# Patient Record
Sex: FEMALE | Race: WHITE | NOT HISPANIC OR LATINO | Employment: OTHER | URBAN - METROPOLITAN AREA
[De-identification: names, ages, dates, MRNs, and addresses within clinical notes are randomized per-mention and may not be internally consistent; named-entity substitution may affect disease eponyms.]

---

## 2023-10-20 ENCOUNTER — LAB (OUTPATIENT)
Dept: LAB | Facility: LAB | Age: 68
End: 2023-10-20
Payer: MEDICARE

## 2023-10-20 DIAGNOSIS — E03.9 HYPOTHYROIDISM, UNSPECIFIED: ICD-10-CM

## 2023-10-20 DIAGNOSIS — M81.0 AGE-RELATED OSTEOPOROSIS WITHOUT CURRENT PATHOLOGICAL FRACTURE: Primary | ICD-10-CM

## 2023-10-20 LAB
ALBUMIN SERPL BCP-MCNC: 4.7 G/DL (ref 3.4–5)
ALP SERPL-CCNC: 59 U/L (ref 33–136)
ALT SERPL W P-5'-P-CCNC: 15 U/L (ref 7–45)
ANION GAP SERPL CALC-SCNC: 14 MMOL/L (ref 10–20)
AST SERPL W P-5'-P-CCNC: 23 U/L (ref 9–39)
BILIRUB SERPL-MCNC: 1 MG/DL (ref 0–1.2)
BUN SERPL-MCNC: 9 MG/DL (ref 6–23)
CALCIUM SERPL-MCNC: 9.9 MG/DL (ref 8.6–10.6)
CHLORIDE SERPL-SCNC: 101 MMOL/L (ref 98–107)
CHOLEST SERPL-MCNC: 316 MG/DL (ref 0–199)
CHOLESTEROL/HDL RATIO: 3.9
CO2 SERPL-SCNC: 28 MMOL/L (ref 21–32)
CREAT SERPL-MCNC: 0.8 MG/DL (ref 0.5–1.05)
ERYTHROCYTE [DISTWIDTH] IN BLOOD BY AUTOMATED COUNT: 13.5 % (ref 11.5–14.5)
GFR SERPL CREATININE-BSD FRML MDRD: 81 ML/MIN/1.73M*2
GLUCOSE SERPL-MCNC: 85 MG/DL (ref 74–99)
HCT VFR BLD AUTO: 46.3 % (ref 36–46)
HDLC SERPL-MCNC: 80.2 MG/DL
HGB BLD-MCNC: 14.8 G/DL (ref 12–16)
LDLC SERPL CALC-MCNC: 223 MG/DL
MCH RBC QN AUTO: 29.2 PG (ref 26–34)
MCHC RBC AUTO-ENTMCNC: 32 G/DL (ref 32–36)
MCV RBC AUTO: 91 FL (ref 80–100)
NON HDL CHOLESTEROL: 236 MG/DL (ref 0–149)
NRBC BLD-RTO: 0 /100 WBCS (ref 0–0)
PLATELET # BLD AUTO: 264 X10*3/UL (ref 150–450)
PMV BLD AUTO: 11.5 FL (ref 7.5–11.5)
POTASSIUM SERPL-SCNC: 4.4 MMOL/L (ref 3.5–5.3)
PROT SERPL-MCNC: 7.1 G/DL (ref 6.4–8.2)
RBC # BLD AUTO: 5.07 X10*6/UL (ref 4–5.2)
SODIUM SERPL-SCNC: 139 MMOL/L (ref 136–145)
TRIGL SERPL-MCNC: 64 MG/DL (ref 0–149)
TSH SERPL-ACNC: 5.11 MIU/L (ref 0.44–3.98)
VLDL: 13 MG/DL (ref 0–40)
WBC # BLD AUTO: 4.6 X10*3/UL (ref 4.4–11.3)

## 2023-10-20 PROCEDURE — 36415 COLL VENOUS BLD VENIPUNCTURE: CPT

## 2023-10-20 PROCEDURE — 80053 COMPREHEN METABOLIC PANEL: CPT

## 2023-10-20 PROCEDURE — 85027 COMPLETE CBC AUTOMATED: CPT

## 2023-10-20 PROCEDURE — 84443 ASSAY THYROID STIM HORMONE: CPT

## 2023-10-20 PROCEDURE — 80061 LIPID PANEL: CPT

## 2023-10-21 PROBLEM — T14.8XXA DEEP TISSUE INJURY: Status: ACTIVE | Noted: 2023-10-21

## 2023-10-21 PROBLEM — K59.04 CHRONIC IDIOPATHIC CONSTIPATION: Status: ACTIVE | Noted: 2023-10-21

## 2023-10-21 PROBLEM — R10.13 DYSPEPSIA: Status: ACTIVE | Noted: 2023-10-21

## 2023-10-21 PROBLEM — D18.03 HEPATIC HEMANGIOMA: Status: ACTIVE | Noted: 2023-10-21

## 2023-10-21 PROBLEM — E03.9 HYPOTHYROIDISM: Status: ACTIVE | Noted: 2023-10-21

## 2023-10-21 PROBLEM — R14.0 ABDOMINAL BLOATING: Status: ACTIVE | Noted: 2023-10-21

## 2023-10-21 PROBLEM — K82.4 GALLBLADDER POLYP: Status: ACTIVE | Noted: 2023-10-21

## 2023-10-21 PROBLEM — R05.3 PERSISTENT COUGH: Status: ACTIVE | Noted: 2023-10-21

## 2023-10-21 PROBLEM — T63.441A BEE STING: Status: ACTIVE | Noted: 2023-10-21

## 2023-10-21 PROBLEM — M81.0 OSTEOPOROSIS: Status: ACTIVE | Noted: 2023-10-21

## 2023-10-21 PROBLEM — F41.8 DEPRESSION WITH ANXIETY: Status: ACTIVE | Noted: 2023-10-21

## 2023-10-21 PROBLEM — K58.9 IBS (IRRITABLE BOWEL SYNDROME): Status: ACTIVE | Noted: 2023-10-21

## 2023-10-21 PROBLEM — L89.90 PRESSURE SORE: Status: ACTIVE | Noted: 2023-10-21

## 2023-10-21 PROBLEM — M79.89 SWELLING OF TOE OF RIGHT FOOT: Status: ACTIVE | Noted: 2023-10-21

## 2023-10-21 RX ORDER — LEVOTHYROXINE SODIUM 50 UG/1
50 TABLET ORAL DAILY
COMMUNITY
End: 2023-12-27 | Stop reason: SDUPTHER

## 2023-10-21 RX ORDER — PLECANATIDE 3 MG/1
1 TABLET ORAL DAILY
COMMUNITY
Start: 2021-12-17

## 2023-10-24 ENCOUNTER — OFFICE VISIT (OUTPATIENT)
Dept: PRIMARY CARE | Facility: CLINIC | Age: 68
End: 2023-10-24
Payer: MEDICARE

## 2023-10-24 VITALS
WEIGHT: 162.48 LBS | HEIGHT: 61 IN | DIASTOLIC BLOOD PRESSURE: 78 MMHG | BODY MASS INDEX: 30.68 KG/M2 | OXYGEN SATURATION: 99 % | RESPIRATION RATE: 16 BRPM | SYSTOLIC BLOOD PRESSURE: 124 MMHG | HEART RATE: 72 BPM

## 2023-10-24 DIAGNOSIS — E03.9 ACQUIRED HYPOTHYROIDISM: ICD-10-CM

## 2023-10-24 DIAGNOSIS — M81.0 OSTEOPOROSIS WITHOUT CURRENT PATHOLOGICAL FRACTURE, UNSPECIFIED OSTEOPOROSIS TYPE: ICD-10-CM

## 2023-10-24 DIAGNOSIS — Z12.11 COLON CANCER SCREENING: Primary | ICD-10-CM

## 2023-10-24 DIAGNOSIS — E78.5 DYSLIPIDEMIA: ICD-10-CM

## 2023-10-24 DIAGNOSIS — Z12.31 ENCOUNTER FOR SCREENING MAMMOGRAM FOR MALIGNANT NEOPLASM OF BREAST: ICD-10-CM

## 2023-10-24 PROCEDURE — 1036F TOBACCO NON-USER: CPT | Performed by: INTERNAL MEDICINE

## 2023-10-24 PROCEDURE — 1125F AMNT PAIN NOTED PAIN PRSNT: CPT | Performed by: INTERNAL MEDICINE

## 2023-10-24 PROCEDURE — G0439 PPPS, SUBSEQ VISIT: HCPCS | Performed by: INTERNAL MEDICINE

## 2023-10-24 PROCEDURE — 1159F MED LIST DOCD IN RCRD: CPT | Performed by: INTERNAL MEDICINE

## 2023-10-24 PROCEDURE — 99213 OFFICE O/P EST LOW 20 MIN: CPT | Performed by: INTERNAL MEDICINE

## 2023-10-24 PROCEDURE — 99397 PER PM REEVAL EST PAT 65+ YR: CPT | Performed by: INTERNAL MEDICINE

## 2023-10-24 ASSESSMENT — ENCOUNTER SYMPTOMS
DEPRESSION: 0
LOSS OF SENSATION IN FEET: 0
OCCASIONAL FEELINGS OF UNSTEADINESS: 0

## 2023-10-24 NOTE — PROGRESS NOTES
Subjective   Reason for Visit: Jessy James is an 68 y.o. female here for a Medicare Wellness visit.     Past Medical, Surgical, and Family History reviewed and updated in chart.    Reviewed all medications by prescribing practitioner or clinical pharmacist (such as prescriptions, OTCs, herbal therapies and supplements) and documented in the medical record.    No recent hospitalizations.    All medications reviewed and reconciled by me the provider..  No use of controlled substances or opiates.    Family history, social history reviewed, no changes.    Patient does not smoke.    Patient does not drink.    Patient hydrates adequately daily.  Eats a well-balanced healthy diet.     Exercises adequately including walking and doing weightbearing exercises.    Patient denies any difficulty with memory or cognition.     Denies any difficulty with hearing.  Patient does not wear hearing aids.    No fall risk.  No recent falls.  Denies any difficulty walking.    Patient with no history of depression anxiety, denies any loss of interest, no feeling of sadness, no lack of motivation.    Patient is independent in all ADLs and IADLs.  Independent bathing, dressing, walking.  Takes care of own finances, shopping and cooking.     End-of-life decision-making power of  reviewed with patient.      HPI  Very pleasant 68-year-old female here for annual wellness and physical.  Doing well.  She is exercising.  Eating a healthy diet.  Her recent lipid panel was quite out of range and unlike her past levels, she was eating red meats few days past.  She has since significant with her lifestyle.  In terms of her anxiety, symptoms have resolved.      Patient Care Team:  Hermes Mason MD as PCP - General  Hermes Mason MD as PCP - [employer] PCP     Review of Systems  No fevers no chills, no unintentional weight changes.  No night sweats.    No URI symptoms.    No new or unusual headaches.    No cough no  "wheezing.    No chest pain or shortness of breath.  No orthopnea no PND.  No palpitations.    Appetite intact, no nausea vomiting diarrhea, no reflux-like symptoms.  No abdominal pain.  No dark stools.    No new joint pain.  No fatigue.  No weakness.    No heat or cold intolerance, constipation diarrhea, unintentional weight changes.    No change in memory  Objective   Vitals:  /78   Pulse 72   Resp 16   Ht 1.549 m (5' 1\")   Wt 73.7 kg (162 lb 7.7 oz)   SpO2 99%   BMI 30.70 kg/m²       Physical Exam  Calm coherent and appropriate    Neck is supple and none tender    Breathing comfortably, clear to auscultation bilaterally    Regular rate rhythm, no murmur gallops or rubs.  Good distal pulses.  No edema.  No JVD.    Abdomen soft and nontender, normal bowel sounds.    Extremities warm well perfused with no clubbing or cyanosis    Cognition intact.    Assessment/Plan   68-year-old female here for annual wellness and physical doing very well.    Dyslipidemia: This was very unlike her past levels, she has already improved her diet.  We will check levels again in 4 to 5 weeks.    Hypothyroidism: TSH slightly above goal, asymptomatic, will check with next of labs.      History of osteoporosis: She is overdue for bone density, she is not on treatment, will get DEXA scan and vitamin D levels.  Continue with weightbearing exercises.    Health maintenance: She tells me she has Cologuard pending, she gets special mammograms due to implants, was done 2 months ago.  Already had flu shot.  She is going to get COVID at local pharmacy.  Discussed end-of-life decision-making power of , look into this.  Overall has a healthy lifestyle.         "

## 2023-10-24 NOTE — PROGRESS NOTES
Subjective   Reason for Visit: Jessy James is an 68 y.o. female here for a Medicare Wellness visit.               HPI    Patient Care Team:  Hermes Mason MD as PCP - General  Hermes Mason MD as PCP - [employer] PCP     Review of Systems    Objective   Vitals:  There were no vitals taken for this visit.      Physical Exam    Assessment/Plan   Problem List Items Addressed This Visit    None

## 2023-10-31 ENCOUNTER — OFFICE VISIT (OUTPATIENT)
Dept: PRIMARY CARE | Facility: CLINIC | Age: 68
End: 2023-10-31
Payer: MEDICARE

## 2023-10-31 VITALS
BODY MASS INDEX: 30.68 KG/M2 | HEART RATE: 78 BPM | HEIGHT: 61 IN | RESPIRATION RATE: 16 BRPM | OXYGEN SATURATION: 95 % | WEIGHT: 162.48 LBS | SYSTOLIC BLOOD PRESSURE: 130 MMHG | DIASTOLIC BLOOD PRESSURE: 80 MMHG

## 2023-10-31 DIAGNOSIS — R10.11 RIGHT UPPER QUADRANT ABDOMINAL PAIN: Primary | ICD-10-CM

## 2023-10-31 DIAGNOSIS — E78.5 DYSLIPIDEMIA: ICD-10-CM

## 2023-10-31 DIAGNOSIS — Z12.11 COLON CANCER SCREENING: ICD-10-CM

## 2023-10-31 PROCEDURE — 1125F AMNT PAIN NOTED PAIN PRSNT: CPT | Performed by: INTERNAL MEDICINE

## 2023-10-31 PROCEDURE — 99214 OFFICE O/P EST MOD 30 MIN: CPT | Performed by: INTERNAL MEDICINE

## 2023-10-31 PROCEDURE — 1159F MED LIST DOCD IN RCRD: CPT | Performed by: INTERNAL MEDICINE

## 2023-10-31 PROCEDURE — 1036F TOBACCO NON-USER: CPT | Performed by: INTERNAL MEDICINE

## 2023-10-31 NOTE — PROGRESS NOTES
"Subjective   Patient ID: Jessy James is a 68 y.o. female who presents for Abdominal Pain (Upper right side ).       68-year-old female here for evaluation of abdominal pain.  She had recent URI with cough, has also been doing a lot of weightlifting exercises, started having pain, initially she describes it as abdominal pain but on further questioning and exam pain appears to be costochondral on the right side.  Gets worse with sneezing laughing or raising her arm.  Gets better if she sits still.   No chest pain shortness of breath.  No abdominal pain otherwise.  No nausea vomiting diarrhea.  Appetite intact.  No dysuria urgency hematuria.  No fevers no chills.  No night sweats.  No weight loss.    Objective   /80   Pulse 78   Resp 16   Ht 1.549 m (5' 1\")   Wt 73.7 kg (162 lb 7.7 oz)   SpO2 95%   BMI 30.70 kg/m²     Physical Exam  Calm coherent and appropriate    Neck is supple and none tender    Breathing comfortably, clear to auscultation bilaterally, she has about 10-11 right midclavicular and axillary lower intercostal space tenderness.  No deformity.    Regular rate rhythm, no murmur gallops or rubs.  Good distal pulses.  No edema.  No JVD.    Abdomen soft and nontender, normal bowel sounds.  No hepatosplenomegaly.  No right upper quadrant tenderness.  Mcguire sign negative.    Extremities warm well perfused with no clubbing or cyanosis    Cognition intact.    Assessment/Plan   68-year-old female here for for evaluation of what appears to be intercostal pain due to recent URI and weight lifting, reassured patient, to take a break from all core exercises and upper body exercises for 10 days or until the pain has resolved.  Call with any new symptoms or if she still has pain by end of next week.    Dyslipidemia: Significantly elevated LDL that is unusual for her, when compared to past levels, she is improving her diet and is going to repeat the labs in November.  Today again we went over some " dietary modifications she can make to help with her cholesterol.      Hypothyroidism: Check TSH in November.    History of osteoporosis: She is overdue for bone density, she is not on treatment, will get DEXA scan and vitamin D levels.  Continue with weightbearing exercises.     Health maintenance: Today she tells me that insurance actually is asking for a Cologuard order, she thought that she already has 1.  Will place order., she gets special mammograms due to implants, was done 2 months ago.  Already had flu shot.  She is going to get COVID at local pharmacy.        Greater than 25 minutes spent counseling patient, documentation.  All questions answered.

## 2023-11-14 ENCOUNTER — TELEPHONE (OUTPATIENT)
Dept: PRIMARY CARE | Facility: CLINIC | Age: 68
End: 2023-11-14
Payer: MEDICARE

## 2023-11-15 ENCOUNTER — OFFICE VISIT (OUTPATIENT)
Dept: PRIMARY CARE | Facility: CLINIC | Age: 68
End: 2023-11-15
Payer: MEDICARE

## 2023-11-15 VITALS
HEART RATE: 62 BPM | SYSTOLIC BLOOD PRESSURE: 130 MMHG | RESPIRATION RATE: 16 BRPM | WEIGHT: 162.48 LBS | BODY MASS INDEX: 30.68 KG/M2 | OXYGEN SATURATION: 96 % | HEIGHT: 61 IN | DIASTOLIC BLOOD PRESSURE: 78 MMHG

## 2023-11-15 DIAGNOSIS — F41.9 ANXIETY: Primary | ICD-10-CM

## 2023-11-15 DIAGNOSIS — K58.2 IRRITABLE BOWEL SYNDROME WITH BOTH CONSTIPATION AND DIARRHEA: ICD-10-CM

## 2023-11-15 PROCEDURE — 1036F TOBACCO NON-USER: CPT | Performed by: INTERNAL MEDICINE

## 2023-11-15 PROCEDURE — 1125F AMNT PAIN NOTED PAIN PRSNT: CPT | Performed by: INTERNAL MEDICINE

## 2023-11-15 PROCEDURE — 99214 OFFICE O/P EST MOD 30 MIN: CPT | Performed by: INTERNAL MEDICINE

## 2023-11-15 PROCEDURE — 1159F MED LIST DOCD IN RCRD: CPT | Performed by: INTERNAL MEDICINE

## 2023-11-15 RX ORDER — BUSPIRONE HYDROCHLORIDE 7.5 MG/1
7.5 TABLET ORAL 2 TIMES DAILY
Qty: 60 TABLET | Refills: 1 | Status: SHIPPED | OUTPATIENT
Start: 2023-11-15 | End: 2024-01-14

## 2023-11-15 NOTE — PROGRESS NOTES
"Sick Visit/Abdominal Pain     Jessy is a 68-year-old female with hypothyroidism dyslipidemia IBS anxiety here for evaluation of abdominal pain.  She has had bloating, intermittent constipation and diarrhea.  Very typical of her IBS.  No nausea no vomiting.  Appetite intact.  No weight loss.  No blood in the stool.  No urgency dysuria hematuria.  No fevers no chills.  No chest pain shortness of breath.  Her IBS was very well controlled when she was on medication for anxiety, but she gained weight on Lexapro and stopped taking it.  Anxiety is coming back, has daily symptoms.  Otherwise she is doing well.  She also had some intercostal pain last visit for exercising, that is better.    Blood pressure 130/78, pulse 62, resp. rate 16, height 1.549 m (5' 1\"), weight 73.7 kg (162 lb 7.7 oz), SpO2 96 %.  Calm coherent and appropriate    Neck is supple and none tender    Breathing comfortably, clear to auscultation bilaterally    Regular rate rhythm, no murmur gallops or rubs.  Good distal pulses.  No edema.  No JVD.    Abdomen is soft, she has some tenderness of the right axillary about eighth intercostal space similar to last visit, but no right upper quadrant abdominal pain.  No rebound or guarding.  No hepatosplenomegaly.    Extremities warm well perfused with no clubbing or cyanosis    Cognition intact.    Assessment plan:  Jessy is a pleasant 68-year-old female here for evaluation of abdominal pain, no red flag symptoms, benign exam, this appears to be more related to her IBS which is flaring.    IBS: Since her anxiety has been poorly controlled her IBS is flaring.  We discussed importance of going back on medication for anxiety, she is agreeable.  If symptoms do not resolve with better control of anxiety patient will call me in 2 to 3 weeks and then will get further work-up for GI etiology.  Call with any new symptoms.    Anxiety: She gained weight on Lexapro, will started on BuSpar 7.5 mg twice a day, call me " by next week if she is not better, we can try Zoloft and see how she does.    Hypothyroidism: She has labs pending for lipid TSH.    History of osteoporosis: She is due for DEXA scan, this has been ordered.  Continue with weightbearing exercises.    Health maintenance: Pending for Cologuard.  Mammogram up-to-date.

## 2023-12-27 DIAGNOSIS — E03.9 ACQUIRED HYPOTHYROIDISM: Primary | ICD-10-CM

## 2023-12-28 RX ORDER — LEVOTHYROXINE SODIUM 50 UG/1
50 TABLET ORAL DAILY
Qty: 90 TABLET | Refills: 0 | Status: SHIPPED | OUTPATIENT
Start: 2023-12-28 | End: 2024-03-05 | Stop reason: ALTCHOICE

## 2024-03-04 ENCOUNTER — LAB (OUTPATIENT)
Dept: LAB | Facility: LAB | Age: 69
End: 2024-03-04
Payer: MEDICARE

## 2024-03-04 DIAGNOSIS — M81.0 OSTEOPOROSIS WITHOUT CURRENT PATHOLOGICAL FRACTURE, UNSPECIFIED OSTEOPOROSIS TYPE: ICD-10-CM

## 2024-03-04 DIAGNOSIS — E78.5 DYSLIPIDEMIA: ICD-10-CM

## 2024-03-04 DIAGNOSIS — E03.9 ACQUIRED HYPOTHYROIDISM: ICD-10-CM

## 2024-03-04 LAB
25(OH)D3 SERPL-MCNC: 36 NG/ML (ref 30–100)
CHOLEST SERPL-MCNC: 222 MG/DL (ref 0–199)
CHOLESTEROL/HDL RATIO: 2.8
HDLC SERPL-MCNC: 79.8 MG/DL
LDLC SERPL CALC-MCNC: 130 MG/DL
NON HDL CHOLESTEROL: 142 MG/DL (ref 0–149)
TRIGL SERPL-MCNC: 62 MG/DL (ref 0–149)
TSH SERPL-ACNC: 8.49 MIU/L (ref 0.44–3.98)
VLDL: 12 MG/DL (ref 0–40)

## 2024-03-04 PROCEDURE — 82306 VITAMIN D 25 HYDROXY: CPT

## 2024-03-04 PROCEDURE — 84443 ASSAY THYROID STIM HORMONE: CPT

## 2024-03-04 PROCEDURE — 36415 COLL VENOUS BLD VENIPUNCTURE: CPT

## 2024-03-04 PROCEDURE — 80061 LIPID PANEL: CPT

## 2024-03-05 DIAGNOSIS — E03.9 ACQUIRED HYPOTHYROIDISM: ICD-10-CM

## 2024-03-05 RX ORDER — LEVOTHYROXINE SODIUM 88 UG/1
88 TABLET ORAL DAILY
Qty: 30 TABLET | Refills: 11 | Status: SHIPPED | OUTPATIENT
Start: 2024-03-05 | End: 2025-03-05

## 2024-03-24 LAB — NONINV COLON CA DNA+OCC BLD SCRN STL QL: NEGATIVE

## 2024-03-25 ENCOUNTER — HOSPITAL ENCOUNTER (OUTPATIENT)
Dept: RADIOLOGY | Facility: CLINIC | Age: 69
End: 2024-03-25
Payer: MEDICARE

## 2024-06-21 ENCOUNTER — HOSPITAL ENCOUNTER (OUTPATIENT)
Dept: RADIOLOGY | Facility: CLINIC | Age: 69
Discharge: HOME | End: 2024-06-21
Payer: MEDICARE

## 2024-06-21 DIAGNOSIS — M81.0 OSTEOPOROSIS WITHOUT CURRENT PATHOLOGICAL FRACTURE, UNSPECIFIED OSTEOPOROSIS TYPE: ICD-10-CM

## 2024-06-21 PROCEDURE — 77080 DXA BONE DENSITY AXIAL: CPT

## 2024-06-21 PROCEDURE — 77080 DXA BONE DENSITY AXIAL: CPT | Performed by: RADIOLOGY

## 2024-07-22 ENCOUNTER — HOSPITAL ENCOUNTER (OUTPATIENT)
Dept: RADIOLOGY | Facility: CLINIC | Age: 69
Discharge: HOME | End: 2024-07-22
Payer: MEDICARE

## 2024-07-22 DIAGNOSIS — R10.11 RIGHT UPPER QUADRANT PAIN: ICD-10-CM

## 2024-07-22 PROCEDURE — 76705 ECHO EXAM OF ABDOMEN: CPT

## 2024-07-22 PROCEDURE — 76705 ECHO EXAM OF ABDOMEN: CPT | Performed by: RADIOLOGY

## 2024-08-01 ENCOUNTER — TELEPHONE (OUTPATIENT)
Dept: ENDOCRINOLOGY | Facility: CLINIC | Age: 69
End: 2024-08-01
Payer: MEDICARE

## 2024-08-01 NOTE — TELEPHONE ENCOUNTER
Good morning:      Patient received labs back and they are not good.  Requesting a sooner appointment.    Please advise.    Thanks

## 2024-10-28 ENCOUNTER — APPOINTMENT (OUTPATIENT)
Dept: ENDOCRINOLOGY | Facility: CLINIC | Age: 69
End: 2024-10-28
Payer: MEDICARE

## 2024-12-16 ENCOUNTER — HOSPITAL ENCOUNTER (OUTPATIENT)
Dept: RADIOLOGY | Facility: CLINIC | Age: 69
Discharge: HOME | End: 2024-12-16
Payer: MEDICARE

## 2024-12-16 DIAGNOSIS — R19.00 INTRA-ABDOMINAL AND PELVIC SWELLING, MASS AND LUMP, UNSPECIFIED SITE: ICD-10-CM

## 2024-12-16 DIAGNOSIS — R63.5 ABNORMAL WEIGHT GAIN: ICD-10-CM

## 2024-12-16 PROCEDURE — 2550000001 HC RX 255 CONTRASTS

## 2024-12-16 PROCEDURE — 74177 CT ABD & PELVIS W/CONTRAST: CPT

## 2024-12-16 PROCEDURE — 74177 CT ABD & PELVIS W/CONTRAST: CPT | Performed by: RADIOLOGY

## 2024-12-21 ENCOUNTER — LAB (OUTPATIENT)
Dept: LAB | Facility: LAB | Age: 69
End: 2024-12-21
Payer: MEDICARE

## 2024-12-21 DIAGNOSIS — E27.8 OTHER SPECIFIED DISORDERS OF ADRENAL GLAND: Primary | ICD-10-CM

## 2024-12-21 LAB — CORTIS AM PEAK SERPL-MSCNC: 18.1 UG/DL (ref 5–20)

## 2024-12-21 PROCEDURE — 36415 COLL VENOUS BLD VENIPUNCTURE: CPT

## 2024-12-21 PROCEDURE — 82533 TOTAL CORTISOL: CPT

## 2025-01-20 ENCOUNTER — APPOINTMENT (OUTPATIENT)
Dept: ENDOCRINOLOGY | Facility: CLINIC | Age: 70
End: 2025-01-20
Payer: MEDICARE

## 2025-07-21 ENCOUNTER — HOSPITAL ENCOUNTER (OUTPATIENT)
Dept: RADIOLOGY | Facility: CLINIC | Age: 70
Discharge: HOME | End: 2025-07-21
Payer: MEDICARE

## 2025-07-21 DIAGNOSIS — K59.09 OTHER CONSTIPATION: ICD-10-CM

## 2025-07-21 DIAGNOSIS — R10.84 GENERALIZED ABDOMINAL PAIN: ICD-10-CM

## 2025-07-21 PROCEDURE — 74018 RADEX ABDOMEN 1 VIEW: CPT | Performed by: STUDENT IN AN ORGANIZED HEALTH CARE EDUCATION/TRAINING PROGRAM

## 2025-07-21 PROCEDURE — 74018 RADEX ABDOMEN 1 VIEW: CPT

## 2025-10-29 ENCOUNTER — APPOINTMENT (OUTPATIENT)
Dept: GASTROENTEROLOGY | Facility: CLINIC | Age: 70
End: 2025-10-29
Payer: MEDICARE